# Patient Record
(demographics unavailable — no encounter records)

---

## 2024-11-01 NOTE — PLAN
[FreeTextEntry1] : DM WIll check HbA1c and lipids today Continue metformin  mg once with dinner Continue to work on diet and exercise .    HYperlipidemia Check lipids today Explained to patient that the LDL goal is <100.  SHe will work on diet. If not improved will start statin   Elevated BP Work on improving diet and increasing exercise  Health Maintenance Mammogram up-to-date Referred to GI for colonoscopy Declined a flu shot  f/u 3 months

## 2024-11-01 NOTE — HEALTH RISK ASSESSMENT
[0] : 2) Feeling down, depressed, or hopeless: Not at all (0) [PHQ-2 Negative - No further assessment needed] : PHQ-2 Negative - No further assessment needed [Never] : Never [[id number]] : 0

## 2024-11-01 NOTE — REVIEW OF SYSTEMS
[Recent Change In Weight] : ~T recent weight change [Negative] : Neurological [Fever] : no fever [Chills] : no chills [FreeTextEntry8] : as noted in HPI

## 2024-11-01 NOTE — HISTORY OF PRESENT ILLNESS
[FreeTextEntry1] : Follow-up diabetes and lipids [de-identified] : 44 y/o female with DM and hyperlipidemia. Last visit her hemoglobin A1c was 6.1 and the LDL was 126.  She is taking metformin  mg with dinner.  She has been tolerating that well.   She has been working on her diet and has been eating less rice and fried dumplings.   She has lost some weight She has started eating marissa.  This is something she used to do when she was younger when she was anemic.. She is trying to improve her diet but didnt eat well this week. She walks at work.  She is due to repeat the imaging for the splenic cyst.

## 2024-11-01 NOTE — PHYSICAL EXAM
[No Acute Distress] : no acute distress [Normal Sclera/Conjunctiva] : normal sclera/conjunctiva [Normal Outer Ear/Nose] : the outer ears and nose were normal in appearance [No Respiratory Distress] : no respiratory distress  [No Accessory Muscle Use] : no accessory muscle use [Clear to Auscultation] : lungs were clear to auscultation bilaterally [Normal Rate] : normal rate  [Regular Rhythm] : with a regular rhythm [Normal S1, S2] : normal S1 and S2 [Soft] : abdomen soft [Non-distended] : non-distended [Normal Bowel Sounds] : normal bowel sounds [Normal Affect] : the affect was normal [Normal Insight/Judgement] : insight and judgment were intact [de-identified] : Overweight [de-identified] : Left lower quadrant tenderness

## 2024-11-27 NOTE — ASSESSMENT
[FreeTextEntry1] : For CRC screening. Will schedule. F/U PRN  Advised to stop eating marissa.   KAIDEN - to follow up with GYN

## 2024-11-27 NOTE — HISTORY OF PRESENT ILLNESS
[FreeTextEntry1] : 45F here for index screening colonoscopy.   Constipated. Was not severe until pica developed 6 months ago and started eating marissa. Now, has bm daily but stool is hard. BRBPR daily - paper only. No abdominal pain. No Fhx CRC.   Pica - craving marissa. Eats 4 oz per week.  +menorrhagia  PMHx fibroids - cause menorrhagia, DM, HL PSHx none All dairy - hives Meds metformin SHx nonsmoker, occ ETOH, management for USPS FHx no FHx cancer